# Patient Record
Sex: MALE | Race: WHITE | Employment: OTHER | URBAN - METROPOLITAN AREA
[De-identification: names, ages, dates, MRNs, and addresses within clinical notes are randomized per-mention and may not be internally consistent; named-entity substitution may affect disease eponyms.]

---

## 2022-03-15 ENCOUNTER — APPOINTMENT (OUTPATIENT)
Dept: GENERAL RADIOLOGY | Age: 27
End: 2022-03-15
Attending: NURSE PRACTITIONER
Payer: OTHER GOVERNMENT

## 2022-03-15 ENCOUNTER — HOSPITAL ENCOUNTER (EMERGENCY)
Age: 27
Discharge: HOME | End: 2022-03-15
Payer: OTHER GOVERNMENT

## 2022-03-15 VITALS
HEART RATE: 74 BPM | WEIGHT: 155 LBS | HEIGHT: 69 IN | BODY MASS INDEX: 22.96 KG/M2 | RESPIRATION RATE: 18 BRPM | SYSTOLIC BLOOD PRESSURE: 117 MMHG | OXYGEN SATURATION: 96 % | DIASTOLIC BLOOD PRESSURE: 69 MMHG | TEMPERATURE: 99.6 F

## 2022-03-15 DIAGNOSIS — J06.9 VIRAL URI WITH COUGH: Primary | ICD-10-CM

## 2022-03-15 LAB
DEPRECATED S PYO AG THROAT QL EIA: NEGATIVE
FLUAV AG NPH QL IA: NEGATIVE
FLUBV AG NOSE QL IA: NEGATIVE

## 2022-03-15 PROCEDURE — 87880 STREP A ASSAY W/OPTIC: CPT

## 2022-03-15 PROCEDURE — 71046 X-RAY EXAM CHEST 2 VIEWS: CPT

## 2022-03-15 PROCEDURE — 87804 INFLUENZA ASSAY W/OPTIC: CPT

## 2022-03-15 PROCEDURE — 74011250637 HC RX REV CODE- 250/637: Performed by: NURSE PRACTITIONER

## 2022-03-15 PROCEDURE — 99284 EMERGENCY DEPT VISIT MOD MDM: CPT

## 2022-03-15 RX ORDER — IBUPROFEN 800 MG/1
800 TABLET ORAL
Status: DISCONTINUED | OUTPATIENT
Start: 2022-03-15 | End: 2022-03-16 | Stop reason: HOSPADM

## 2022-03-15 RX ORDER — LORATADINE AND PSEUDOEPHEDRINE SULFATE 10; 240 MG/1; MG/1
1 TABLET, EXTENDED RELEASE ORAL DAILY
Qty: 14 TABLET | Refills: 0 | Status: SHIPPED | OUTPATIENT
Start: 2022-03-15

## 2022-03-15 RX ORDER — ALBUTEROL SULFATE 90 UG/1
2 AEROSOL, METERED RESPIRATORY (INHALATION)
Qty: 6.7 G | Refills: 0 | Status: SHIPPED | OUTPATIENT
Start: 2022-03-15

## 2022-03-15 RX ORDER — CODEINE PHOSPHATE AND GUAIFENESIN 10; 100 MG/5ML; MG/5ML
10 SOLUTION ORAL
Status: COMPLETED | OUTPATIENT
Start: 2022-03-15 | End: 2022-03-15

## 2022-03-15 RX ORDER — PROMETHAZINE HYDROCHLORIDE AND DEXTROMETHORPHAN HYDROBROMIDE 6.25; 15 MG/5ML; MG/5ML
5 SYRUP ORAL
Qty: 120 ML | Refills: 0 | Status: SHIPPED | OUTPATIENT
Start: 2022-03-15 | End: 2022-03-22

## 2022-03-15 RX ADMIN — GUAIFENESIN AND CODEINE PHOSPHATE 10 ML: 10; 100 LIQUID ORAL at 23:35

## 2022-03-16 NOTE — ED PROVIDER NOTES
EMERGENCY DEPARTMENT HISTORY AND PHYSICAL EXAM      Date: 3/15/2022  Patient Name: Mavis Blake    History of Presenting Illness     Chief Complaint   Patient presents with    Fever    Sore Throat       History Provided By: Patient    HPI: Mavis Blake, 31 y.o. male with a past medical history significant No significant past medical history presents to the ED with cc of Fever and cough. Patient has had a fever and cough x2 days. Patient was tested for Covid this morning but does not receive the results. Patient is currently stationed in [place]. Moderate severity, no known exacerbating or relieving factors, no other associated signs and symptoms     There are no other complaints, changes, or physical findings at this time. PCP: [place], MD Nathalie    No current facility-administered medications on file prior to encounter. No current outpatient medications on file prior to encounter. Past History     Past Medical History:  No past medical history on file. Past Surgical History:  No past surgical history on file. Family History:  No family history on file. Social History:  Social History     Tobacco Use    Smoking status: Not on file    Smokeless tobacco: Not on file   Substance Use Topics    Alcohol use: Not on file    Drug use: Not on file       Allergies: Allergies   Allergen Reactions    Zithromax [Azithromycin] Hives         Review of Systems     Review of Systems   Constitutional: Positive for fever. Negative for chills. HENT: Negative for dental problem and sore throat. Eyes: Negative for pain and visual disturbance. Respiratory: Positive for cough. Negative for chest tightness. Cardiovascular: Negative for chest pain. Gastrointestinal: Negative for diarrhea and nausea. Genitourinary: Negative for difficulty urinating and frequency. Musculoskeletal: Negative for gait problem and joint swelling. Neurological: Positive for headaches. Negative for numbness. Hematological: Negative for adenopathy. Does not bruise/bleed easily. Psychiatric/Behavioral: Negative for behavioral problems and suicidal ideas. Physical Exam     Physical Exam  Constitutional:       General: He is not in acute distress. Appearance: Normal appearance. He is not ill-appearing or toxic-appearing. HENT:      Head: Normocephalic and atraumatic. Nose: Nose normal.      Mouth/Throat:      Mouth: Mucous membranes are moist.   Eyes:      Extraocular Movements: Extraocular movements intact. Pupils: Pupils are equal, round, and reactive to light. Cardiovascular:      Rate and Rhythm: Normal rate and regular rhythm. Pulmonary:      Effort: Pulmonary effort is normal.      Breath sounds: Normal breath sounds. Abdominal:      General: Bowel sounds are normal.   Musculoskeletal:         General: Normal range of motion. Cervical back: Normal range of motion and neck supple. Skin:     General: Skin is warm and dry. Capillary Refill: Capillary refill takes less than 2 seconds. Neurological:      General: No focal deficit present. Mental Status: He is alert and oriented to person, place, and time. Psychiatric:         Mood and Affect: Mood normal.         Behavior: Behavior normal.         Lab and Diagnostic Study Results     Labs -     Recent Results (from the past 12 hour(s))   STREP AG SCREEN, GROUP A    Collection Time: 03/15/22  9:59 PM    Specimen: Throat   Result Value Ref Range    Group A Strep Ag ID Negative     INFLUENZA A & B AG (RAPID TEST)    Collection Time: 03/15/22 10:00 PM   Result Value Ref Range    Influenza A Antigen Negative Negative      Influenza B Antigen Negative Negative         Radiologic Studies -   @lastxrresult@  CT Results  (Last 48 hours)    None        CXR Results  (Last 48 hours)               03/15/22 2222  XR CHEST PA LAT Final result    Impression:  1.  No acute cardiopulmonary disease       Narrative:  PA Lateral Chest 3/15/2022       Comparison: Unavailable       Indication:  cough, fever         Findings:    Heart size within normal limits. No significant pulmonary consolidation, jose   edema or pleural effusion. No pneumothorax. Medical Decision Making   - I am the first provider for this patient. - I reviewed the vital signs, available nursing notes, past medical history, past surgical history, family history and social history. - Initial assessment performed. The patients presenting problems have been discussed, and they are in agreement with the care plan formulated and outlined with them. I have encouraged them to ask questions as they arise throughout their visit. Vital Signs-Reviewed the patient's vital signs. Patient Vitals for the past 12 hrs:   Temp Pulse Resp BP SpO2   03/15/22 2327 99.6 °F (37.6 °C) 74 18  96 %   03/15/22 2155 (!) 102.8 °F (39.3 °C) (!) 58 16 117/69 95 %       Records Reviewed: Nursing Notes and Old Medical Records          ED Course:          Provider Notes (Medical Decision Making):   Pt presents with acute URI symptoms including nasal congestion, rhinorrhea and sore throat. Pt also has c/o of cough without dyspnea, chest pain or wheezing. Pt is well-appearing with stable vitals and benign exam; symptoms are consistent with an uncomplicated URI. DDx: acute bronchitis, COVID-19, bacterial sinusitis vs. pharyngitis, migraine, flu. Symptomatic therapy suggested: acetaminophen, ibuprofen, antihistamine-decongestant of choice, cough suppressant of choice. Increase fluids, use vaporizer, stay in steamy bathroom tid 15 min prn severe cough, tylenol as needed, rest, avoid smoky areas. Lack of antibiotic effectiveness discussed with her. Symptomatic therapy suggested: gargle for sore throat, use mist at bedside for congestion. Apply facial warm packs for sinus pain or use nasal saline sprays. Follow up prn if not better in 72 hours.   MDM       Procedures   Medical Decision Makingedical Decision Making  Performed by: Shereen Canada NP  PROCEDURES:  Procedures       Disposition   Disposition: DC- Adult Discharges: All of the diagnostic tests were reviewed and questions answered. Diagnosis, care plan and treatment options were discussed. The patient understands the instructions and will follow up as directed. The patients results have been reviewed with them. They have been counseled regarding their diagnosis. The patient verbally convey understanding and agreement of the signs, symptoms, diagnosis, treatment and prognosis and additionally agrees to follow up as recommended with their PCP in 24 - 48 hours. They also agree with the care-plan and convey that all of their questions have been answered. I have also put together some discharge instructions for them that include: 1) educational information regarding their diagnosis, 2) how to care for their diagnosis at home, as well a 3) list of reasons why they would want to return to the ED prior to their follow-up appointment, should their condition change. Discharged    DISCHARGE PLAN:  1. There are no discharge medications for this patient. 2.   Follow-up Information    None       3. Return to ED if worse   4. Discharge Medication List as of 3/15/2022 11:38 PM      START taking these medications    Details   promethazine-dextromethorphan (PROMETHAZINE-DM) 6.25-15 mg/5 mL syrup Take 5 mL by mouth every four (4) hours as needed for Cough for up to 7 days. , Normal, Disp-120 mL, R-0      loratadine-pseudoephedrine (Claritin-D 24 Hour)  mg per tablet Take 1 Tablet by mouth daily. , Normal, Disp-14 Tablet, R-0      albuterol (Ventolin HFA) 90 mcg/actuation inhaler Take 2 Puffs by inhalation every four (4) hours as needed for Wheezing., Normal, Disp-6.7 g, R-0               Diagnosis     Clinical Impression:   1.  Viral URI with cough        Attestations:    Shereen Canada NP    Please note that this dictation was completed with Dragon, the computer voice recognition software. Quite often unanticipated grammatical, syntax, homophones, and other interpretive errors are inadvertently transcribed by the computer software. Please disregard these errors. Please excuse any errors that have escaped final proofreading. Thank you.